# Patient Record
Sex: FEMALE | Race: WHITE
[De-identification: names, ages, dates, MRNs, and addresses within clinical notes are randomized per-mention and may not be internally consistent; named-entity substitution may affect disease eponyms.]

---

## 2020-01-27 ENCOUNTER — HOSPITAL ENCOUNTER (EMERGENCY)
Dept: HOSPITAL 76 - ED | Age: 20
Discharge: HOME | End: 2020-01-27
Payer: COMMERCIAL

## 2020-01-27 VITALS — DIASTOLIC BLOOD PRESSURE: 115 MMHG | SYSTOLIC BLOOD PRESSURE: 128 MMHG

## 2020-01-27 DIAGNOSIS — S83.92XA: Primary | ICD-10-CM
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
## 2020-01-27 PROCEDURE — 99283 EMERGENCY DEPT VISIT LOW MDM: CPT

## 2020-01-27 PROCEDURE — 99284 EMERGENCY DEPT VISIT MOD MDM: CPT

## 2020-01-27 NOTE — ED PHYSICIAN DOCUMENTATION
PD HPI LOWER EXT INJURY





- Stated complaint


Stated Complaint: L KNEE INJ





- Chief complaint


Chief Complaint: Ext Problem





- History obtained from


History obtained from: Patient, Family





- History of Present Illness


PD HPI LOW EXT INJURY LOCATION: Left, Knee


Type of injury: Fall, Twist


Where injury occurred: [place]


Timing - onset: How many days ago (2)


Timing - duration: Days (2)


Timing - details: Abrupt onset, Still present


Improved by: Rest, Immobilization


Worsened by: Moving, Palpating


Associated symptoms: Swelling.  No: Weakness, Numbness, Tingling


Contributing factors: No: Anticoagulated, Prior ortho surgery


Similar symptoms before: Has not had sx before


Recently seen: Not recently seen





- Additional information


Additional information: 





20-year-old female was snow skiing 2 days ago during a snowplow when she fell 

and twisted her left knee.  She has some pain to the lateral aspect of the knee 

and is unable to bear weight.





Review of Systems


Constitutional: denies: Fever


Eyes: denies: Decreased vision


Ears: denies: Ear pain


Nose: denies: Congestion


Respiratory: denies: Cough


GI: denies: Vomiting





PD PAST MEDICAL HISTORY





- Allergies


Allergies/Adverse Reactions: 


                                    Allergies











Allergy/AdvReac Type Severity Reaction Status Date / Time


 


No Known Drug Allergies Allergy   Verified 01/27/20 10:11














PD ED PE NORMAL





- Vitals


Vital signs reviewed: Yes (Tachycardic and hypertensive)





- General


General: Alert and oriented X 3, No acute distress, Well developed/nourished





- HEENT


HEENT: Atraumatic, PERRL, EOMI





- Neck


Neck: Supple, no meningeal sign





- Respiratory


Respiratory: No respiratory distress





- Derm


Derm: Normal color, Warm and dry, No rash





- Extremities


Extremities: No deformity, No edema, Other (Examination of the left knee reveals

some tenderness to the lateral joint line she has some pain to the lateral joint

line with compression and she has stable ligaments to testing.  There is a 

palpable joint effusion the distal neurovascular components are intact.)





- Neuro


Neuro: Alert and oriented X 3, CNs 2-12 intact, No motor deficit, No sensory 

deficit, Normal speech


Eye Opening: Spontaneous


Motor: Obeys Commands


Verbal: Oriented


GCS Score: 15





- Psych


Psych: Normal mood, Normal affect





Results





- Vitals


Vitals: 


                               Vital Signs - 24 hr











  01/27/20 01/27/20





  10:11 10:22


 


Temperature 36.5 C 37.1 C


 


Heart Rate 108 H 114 H


 


Respiratory 14 18





Rate  


 


Blood Pressure 145/104 H 135/94 H


 


O2 Saturation 98 96








                                     Oxygen











O2 Source                      Room air

















- Rads (name of study)


  ** knee


Radiology: Prelim report reviewed (Impression: 1.  No evidence for acute 

fracture or dislocation of the left knee.  2. Small joint effusion is noted.), 

EMP read indepedently, See rad report





PD MEDICAL DECISION MAKING





- ED course


Complexity details: reviewed results, re-evaluated patient, considered 

differential, d/w patient


ED course: 





20-year-old female with a sprain to the left knee has stable ligaments on 

testing she does have some pain with range of motion and some locking of her 

knee consistent with an internal derangement.  She does have a small joint 

effusion.  She is placed into a knee immobilizer and will have follow-up with 

orthopedics.





Departure





- Departure


Disposition: 01 Home, Self Care


Clinical Impression: 


Left knee sprain


Qualifiers:


 Encounter type: initial encounter Involved ligament of knee: unspecified ligam

ent Qualified Code(s): S83.92XA - Sprain of unspecified site of left knee, 

initial encounter





Condition: Stable


Instructions:  ED Effusion Knee, ED Sprain Knee


Follow-Up: 


Sahra Peña ARNP [Primary Care Provider] - 


[facility] [Provider Group]


Forms:  Activity restrictions

## 2020-01-27 NOTE — XRAY REPORT
Reason:  twisting injury skiing lateral joint line pain.

Procedure Date:  01/27/2020   

Accession Number:  [id number] / [id number]                    

Procedure:  XR  - Knee 4 View LT CPT Code:  

 

***Final Report***

 

 

FULL RESULT:

 

 

EXAM:

LEFT KNEE RADIOGRAPHY

 

EXAM DATE: 1/27/2020 10:43 AM.

 

CLINICAL HISTORY: Twisting injury skiing lateral joint line pain.

 

COMPARISON: None.

 

TECHNIQUE: 4 views.

 

FINDINGS:

Bones: Normal. No fractures or bone lesions.

 

Joints: Small joint effusion. No subluxation.

 

Soft Tissues: Normal. No soft tissue swelling.

IMPRESSION:

1. No evidence for acute fracture or dislocation of the left knee.

2. Small joint effusion is noted.

 

[facility]